# Patient Record
(demographics unavailable — no encounter records)

---

## 2017-10-07 NOTE — RAD
HISTORY:

seizure  



COMPARISON:

None available. 



TECHNIQUE:

Chest, one view.



FINDINGS:

Examination limited by habitus.



LUNGS:

Mild increased interstitial markings may reflect infection or edema. 

Bilateral hilar prominence.



PLEURA:

No significant pleural effusion identified. No definite pneumothorax .



CARDIOVASCULAR:

Heart size appears top normal.  



OSSEOUS STRUCTURES:

 Degenerative changes.



VISUALIZED UPPER ABDOMEN:

Unremarkable.



OTHER FINDINGS:

None.



IMPRESSION:

Mild increased interstitial markings may reflect infection or edema. 

Bilateral hilar prominence.

## 2017-10-07 NOTE — CT
PROCEDURE:  CT HEAD WITHOUT CONTRAST.



HISTORY:

seizure



COMPARISON:

None available. 



TECHNIQUE:

Axial computed tomography images were obtained through the head/brain 

without intravenous contrast.  



Radiation dose:



Total exam DLP = 823.45 mGy-cm.



This CT exam was performed using one or more of the following dose 

reduction techniques: Automated exposure control, adjustment of the 

mA and/or kV according to patient size, and/or use of iterative 

reconstruction technique.



FINDINGS:



HEMORRHAGE:

No intracranial hemorrhage. 



BRAIN:

Diffuse atrophy with prominence of the ventricles and sulci noted. No 

mass effect or edema.  Mild scattered white matter hypodensities, 

which are nonspecific, but often seen with chronic microvascular 

ischemic disease. Please note that MRI with diffusion imaging is more 

sensitive in the detection of acute ischemic event.



VENTRICLES:

No hydrocephalus. 



CALVARIUM:

Unremarkable.



PARANASAL SINUSES:

Unremarkable as visualized. No significant inflammatory changes.



MASTOID AIR CELLS:

Unremarkable as visualized. No inflammatory changes.



OTHER FINDINGS:

None.



IMPRESSION:

No acute intracranial pathology identified.

## 2017-10-07 NOTE — ED PDOC
Arrival/HPI





- General


Chief Complaint: High Blood Pressure


Time Seen by Provider: 10/07/17 14:18


Historian: Family,  (son translates)





- History of Present Illness


Time/Duration: Prior to Arrival


Associated Symptoms (Text): 





10/07/17 14:51


Son translates and reports that his mother is here visiting from [place] arriving 

approximately 10 days ago and will be here for approximately 4 months. She has 

a history of hypertension and diabetes. She took her last insulin and metformin 

this morning. He reports a generalized shaking episode 2 days ago which lasted 

for approximately 2 minutes. No seizure history. No bowel or bladder 

incontinence. No fever or chills. No injury or trauma. She does not have a 

primary caregiver here. She needs her medication.





Past Medical History





- Infectious Disease


Hx of Infectious Diseases: None





- Reproductive


Menopause: Yes





- Cardiac


Hx Hypertension: Yes





- Endocrine/Metabolic


Hx Diabetes Mellitus Type 1: Yes





- Psychiatric


Hx Substance Use: No





Family/Social History





- Physician Review


Nursing Documentation Reviewed: Yes


Family/Social History: Unknown Family HX


Smoking Status: Never Smoked


Hx Alcohol Use: No


Hx Substance Use: No





Allergies/Home Meds


Allergies/Adverse Reactions: 


Allergies





No Known Allergies Allergy (Verified 10/07/17 14:34)


 








Home Medications: 


 Home Meds











 Medication  Instructions  Recorded  Confirmed


 


Insulin NPH Hum/Reg Insulin Hm 32 units SC ACB 10/07/17 10/07/17





[Humulin 70/30 70 U/ml-30 U/ml 10   





ml]   


 


MetFORMIN [glucOPHAGE] 850 mg PO HS 10/07/17 10/07/17














Review of Systems





- Physician Review


All systems were reviewed & negative as marked: Yes





- Review of Systems


Constitutional: Normal


Respiratory: Normal


Cardiovascular: Normal


Gastrointestinal: Normal


Genitourinary Female: Normal


Neurological: Normal





Physical Exam


Vital Signs











  Temp Pulse Pulse Resp BP Pulse Ox


 


 10/07/17 16:25   68   18  160/85 H  98


 


 10/07/17 14:30    65   


 


 10/07/17 14:28  98.8 F  70   16  125/78  97











Temperature: Afebrile


Blood Pressure: Normal


Pulse: Regular


Respiratory Rate: Normal


Appearance: Positive for: Well-Appearing, Non-Toxic, Comfortable


Pain Distress: None


Mental Status: Positive for: other (awake alert and cooperative)





- Systems Exam


Head: Present: Atraumatic, Normocephalic


Pupils: Present: PERRL


Extroacular Muscles: Present: EOMI


Conjunctiva: Present: Normal


Mouth: Present: Moist Mucous Membranes


Pharnyx: No: ERYTHEMA, EXUDATE, TONSILS ENLARGED


Neck: Present: Normal Range of Motion


Respiratory/Chest: Present: Clear to Auscultation, Good Air Exchange.  No: 

Respiratory Distress, Accessory Muscle Use


Cardiovascular: Present: Regular Rate and Rhythm, Normal S1, S2.  No: Murmurs


Abdomen: Present: Normal Bowel Sounds.  No: Tenderness, Distention, Peritoneal 

Signs, Rebound, Guarding


Back: Present: Normal Inspection


Upper Extremity: Present: Normal Inspection.  No: Cyanosis, Edema


Lower Extremity: Present: Normal Inspection.  No: Edema


Neurological: Present: GCS=15, CN II-XII Intact, Speech Normal, Motor Func 

Grossly Intact


Skin: Present: Warm, Dry, Normal Color.  No: Rashes





Medical Decision Making


ED Course and Treatment: 





10/07/17 15:45


EKG shows normal sinus rhythm with a sinus arrhythmia and PACs with no acute ST 

or T-wave changes rate approximately 65








CHEST X-RAY


Dictator : Meka Delaney MD


Report Date : 10/07/2017 16:02:19


IMPRESSION: Mild increased interstitial markings may reflect infection or 

edema. Bilateral hilar prominence.





CT HEAD WITHOUT CONTRAST.


Dictator : Meka Delaney MD


Report Date : 10/07/2017 16:29:09


IMPRESSION: No acute intracranial pathology identified.





10/07/17 16:32


Patient has run out of her metformin and NovoLog 70/30. She will be given 

prescriptions and the phone number and information for the clinic. Follow up in 

the ER as needed.











- Lab Interpretations


Lab Results: 








 10/07/17 14:50 





 10/07/17 15:35 





 Lab Results





10/07/17 15:35: Sodium 143, Potassium 4.5, Chloride 107, Carbon Dioxide 25, 

Anion Gap 16, BUN 27 H, Creatinine 1.7 H, Est GFR ( Amer) 36, Est GFR (

Non-Af Amer) 29, Random Glucose 55 L, Calcium 9.2, Magnesium 1.9, Total 

Bilirubin 0.3, AST 22, ALT 25, Alkaline Phosphatase 66, Lactate Dehydrogenase 

447, Total Creatine Kinase 48, Troponin I < 0.01, Total Protein 7.5, Albumin 4.0

, Globulin 3.5, Albumin/Globulin Ratio 1.1


10/07/17 14:50: WBC 6.2, RBC 4.11, Hgb 10.9 L, Hct 33.7 L, MCV 82.0, MCH 26.5, 

MCHC 32.3, RDW 16.2 H, Plt Count 348, MPV 9.9, Gran % 41.4 L, Lymph % (Auto) 

47.5 H, Mono % (Auto) 7.9 H, Eos % (Auto) 2.4, Baso % (Auto) 0.8, Gran # 2.55, 

Lymph # 2.9, Mono # 0.5, Eos # 0.2, Baso # 0.05











- RAD Interpretation


Radiology Orders: 








10/07/17 14:46


HEAD W/O CONTRAST [CT] Stat 


CHEST PORTABLE [RAD] Stat 








CT scan of the head as read by the radiologist shows no acute findings.


: Radiologist





Disposition/Present on Arrival





- Present on Arrival


Any Indicators Present on Arrival: No


History of DVT/PE: No


History of Uncontrolled Diabetes: No


Urinary Catheter: No


History of Decub. Ulcer: No


History Surgical Site Infection Following: None





- Disposition


Have Diagnosis and Disposition been Completed?: Yes


Diagnosis: 


 Diabetes, Hypertension, Seizure





Disposition: HOME/ ROUTINE


Disposition Time: 16:33


Patient Plan: Discharge


Patient Problems: 


 Current Active Problems











Problem Status Onset


 


Diabetes Acute  


 


Hypertension Acute  


 


Seizure Acute  











Condition: GOOD


Discharge Instructions (ExitCare):  Diabetes Mellitus Type 1 in Adults (ED), 

Hypertension (ED), New-Onset Seizure in Adults (ED)


Prescriptions: 


metFORMIN [glucOPHAGE] 850 mg PO DAILY #14 tab


Insulin Aspart [Novolog FLEXPEN] 32 unit SQ DAILY #1 ml


Referrals: 


[facility] [Outside] - Follow up with primary


Forms:  [employer] (English)

## 2017-10-08 NOTE — CARD
--------------- APPROVED REPORT --------------





EKG Measurement

Heart [id number]

RI [id number]

[id number]

[id number]

[id number]



<Conclusion>

Sinus rhythm with marked sinus arrhythmia

Otherwise normal ECG